# Patient Record
Sex: FEMALE | HISPANIC OR LATINO | URBAN - METROPOLITAN AREA
[De-identification: names, ages, dates, MRNs, and addresses within clinical notes are randomized per-mention and may not be internally consistent; named-entity substitution may affect disease eponyms.]

---

## 2018-07-18 ENCOUNTER — OFFICE VISIT (OUTPATIENT)
Dept: URBAN - METROPOLITAN AREA CLINIC 48 | Facility: CLINIC | Age: 48
End: 2018-07-18
Payer: MEDICAID

## 2018-07-18 DIAGNOSIS — E11.9 TYPE 2 DIABETES MELLITUS W/O COMPLICATION: Primary | ICD-10-CM

## 2018-07-18 DIAGNOSIS — H35.52 PIGMENTARY RETINAL DYSTROPHY: ICD-10-CM

## 2018-07-18 PROCEDURE — 99203 OFFICE O/P NEW LOW 30 MIN: CPT | Performed by: OPHTHALMOLOGY

## 2018-07-18 ASSESSMENT — INTRAOCULAR PRESSURE
OD: 14
OS: 14

## 2018-07-18 NOTE — IMPRESSION/PLAN
Impression: Type 2 diabetes mellitus w/o complication: T55.9. Plan: Diabetes type II: no background retinopathy, no signs of neovascularization noted. Discussed ocular and systemic benefits of blood sugar control.

## 2018-07-18 NOTE — IMPRESSION/PLAN
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 Plan: Patient has heard about injections in the eye for her problem. She would like retina consult.

## 2018-09-05 ENCOUNTER — OFFICE VISIT (OUTPATIENT)
Dept: URBAN - METROPOLITAN AREA CLINIC 48 | Facility: CLINIC | Age: 48
End: 2018-09-05
Payer: MEDICAID

## 2018-09-05 DIAGNOSIS — H25.13 AGE-RELATED NUCLEAR CATARACT, BILATERAL: ICD-10-CM

## 2018-09-05 PROCEDURE — 92134 CPTRZ OPH DX IMG PST SGM RTA: CPT | Performed by: OPHTHALMOLOGY

## 2018-09-05 PROCEDURE — 99213 OFFICE O/P EST LOW 20 MIN: CPT | Performed by: OPHTHALMOLOGY

## 2018-09-05 ASSESSMENT — INTRAOCULAR PRESSURE
OS: 15
OD: 16

## 2018-09-05 NOTE — IMPRESSION/PLAN
Impression: Retinitis pigmentosa: H35.52. Bilateral OU. Plan: OCT ordered and performed today. Discussed with patient her diagnosis. Discussed with patient she can have a consult with a retinal specialist for RP in [facility] or [facility] in [place] to perform more testing on her type of Retinitis pigmentosa. Referral to explore possible treatments for RP given to patient. Patient can have a consult with a low vision specialist if desired. Patient understands there is no cure for RP and understands RP is genetic. Patient to call if vision worsens.

## 2018-09-05 NOTE — IMPRESSION/PLAN
Impression: Age-related nuclear cataract, bilateral: H25.13. Plan: Discussed with Patient her cataracts are minimal and I don't recommend having surgery to remove at this time.

## 2025-08-08 ENCOUNTER — OFFICE VISIT (OUTPATIENT)
Dept: URBAN - METROPOLITAN AREA CLINIC 46 | Facility: CLINIC | Age: 55
End: 2025-08-08
Payer: COMMERCIAL

## 2025-08-08 DIAGNOSIS — E11.9 TYPE 2 DIABETES MELLITUS W/O COMPLICATION: Primary | ICD-10-CM

## 2025-08-08 DIAGNOSIS — H04.123 DRY EYE SYNDROME OF BILATERAL LACRIMAL GLANDS: ICD-10-CM

## 2025-08-08 DIAGNOSIS — H35.52 RETINITIS PIGMENTOSA: ICD-10-CM

## 2025-08-08 PROCEDURE — 99204 OFFICE O/P NEW MOD 45 MIN: CPT | Performed by: OPTOMETRIST

## 2025-08-08 ASSESSMENT — KERATOMETRY
OS: 45.64
OD: 43.80

## 2025-08-08 ASSESSMENT — INTRAOCULAR PRESSURE
OD: 17
OS: 17